# Patient Record
Sex: FEMALE | URBAN - METROPOLITAN AREA
[De-identification: names, ages, dates, MRNs, and addresses within clinical notes are randomized per-mention and may not be internally consistent; named-entity substitution may affect disease eponyms.]

---

## 2017-07-26 ENCOUNTER — THERAPY VISIT (OUTPATIENT)
Dept: PHYSICAL THERAPY | Facility: CLINIC | Age: 37
End: 2017-07-26
Payer: COMMERCIAL

## 2017-07-26 DIAGNOSIS — M25.511 RIGHT SHOULDER PAIN: Primary | ICD-10-CM

## 2017-07-26 PROCEDURE — 97161 PT EVAL LOW COMPLEX 20 MIN: CPT | Mod: GP | Performed by: PHYSICAL THERAPIST

## 2017-07-26 PROCEDURE — 97110 THERAPEUTIC EXERCISES: CPT | Mod: GP | Performed by: PHYSICAL THERAPIST

## 2017-07-26 NOTE — PROGRESS NOTES
Subjective:    Patient is a 36 year old female presenting with rehab right shoulder hpi.   Sydnee Vieira is a 36 year old female with a right shoulder condition.  Condition occurred with:  Unknown cause.  Condition occurred: during recreation/sport.  This is a new condition  Pt reports she has been going to core power yoga and has been having some right shoulder pain for the past 2 months. She saw her MD on 7-6-17 and was referred to PT. She does have a hx of some shoulder pain in the past that she received PT for that was helpful.    Patient reports pain:  Anterior and lateral.  Radiates to:  Upper arm.    and reported as 4/10.  Associated symptoms:  Painful arc. Pain is worse during the day and worse in the P.M..  Symptoms are exacerbated by using arm overhead and lying on extremity and relieved by nothing.                                                        Objective:    Standing Alignment:      Shoulder/UE:  Rounded shoulders, scapular winging L and scapular winging R                                       Shoulder Evaluation:  ROM:  AROM:    Flexion:  Left:  180    Right:  180    Abduction:  Left: 180   Right:  180                      PROM:            Internal Rotation:  Left:  50    Right:  40  External Rotation:  Left:  95    Right:  100                Pain: Pain at mid range TOMMIE elevation on right    Strength:    Flexion: Right: /5 strong/pain free    Pain:   Extension:  Right: /5  Strong/pain free  Pain:  Abduction:  Right:/5  Strong/painful    Pain:    Internal Rotation:  Right: 4-/5      Pain:+            Stability Testing:  normal      Special Tests:      Right shoulder positive for the following special tests:Impingement  Right shoulder negative for the following special tests:Labral  Palpation:  not assessed      Mobility Tests:            Scapulothoracic left:  Hypermobile  Scapulothoracic right:  Hypermobile    Scapulohumeral rhythm right:  Hypermobile  Scapulohumeral rhythm right:  Hypermobile                                    General     ROS    Assessment/Plan:      Patient is a 36 year old female with right side shoulder complaints.    Patient has the following significant findings with corresponding treatment plan.                Diagnosis 1:  R shoulder pain  Pain -  hot/cold therapy and manual therapy  Decreased strength - therapeutic exercise and therapeutic activities  Impaired muscle performance - neuro re-education  Decreased function - therapeutic activities  Instability -  Therapeutic Activity  Therapeutic Exercise    Therapy Evaluation Codes:   1) History comprised of:   Personal factors that impact the plan of care:      None.    Comorbidity factors that impact the plan of care are:      None.     Medications impacting care: None.  2) Examination of Body Systems comprised of:   Body structures and functions that impact the plan of care:      Shoulder.   Activity limitations that impact the plan of care are:      Lifting.  3) Clinical presentation characteristics are:   Stable/Uncomplicated.  4) Decision-Making    Low complexity using standardized patient assessment instrument and/or measureable assessment of functional outcome.  Cumulative Therapy Evaluation is: Low complexity.    Previous and current functional limitations:  (See Goal Flow Sheet for this information)    Short term and Long term goals: (See Goal Flow Sheet for this information)     Communication ability:  Patient appears to be able to clearly communicate and understand verbal and written communication and follow directions correctly.  Treatment Explanation - The following has been discussed with the patient:   RX ordered/plan of care  Anticipated outcomes  Possible risks and side effects  This patient would benefit from PT intervention to resume normal activities.   Rehab potential is excellent.    Frequency:  1 X week, once daily  Duration:  for 6 weeks  Discharge Plan:  Achieve all LTG.  Independent in home treatment  program.  Reach maximal therapeutic benefit.    Please refer to the daily flowsheet for treatment today, total treatment time and time spent performing 1:1 timed codes.

## 2017-07-26 NOTE — LETTER
[facility]  [address]  [place] [address] [phone]    2017    Re: Sydnee Vieira   :   1980  MRN:  [id number]   REFERRING PHYSICIAN:   Hetal Levine    [facility]  Date of Initial Evaluation:  2017  Visits: 1 Rxs Used: 1  Reason for Referral:  Right shoulder pain    EVALUATION SUMMARY  Subjective:  Patient is a 36 year old female presenting with rehab right shoulder hpi.   Sydnee Vieira is a 36 year old female with a right shoulder condition.  Condition occurred with:  Unknown cause.  Condition occurred: during recreation/sport.  This is a new condition  Pt reports she has been going to core power yoga and has been having some right shoulder pain for the past 2 months. She saw her MD on and was referred to PT. She does have a hx of some shoulder pain in the past that she received PT for that was helpful.    Patient reports pain:  Anterior and lateral.  Radiates to:  Upper arm.    and reported as 4/10.  Associated symptoms:  Painful arc. Pain is worse during the day and worse in the P.M.  Symptoms are exacerbated by using arm overhead and lying on extremity and relieved by nothing.                          Pertinent medical history includes:  Smoking.  Medical allergies: no.  Other surgeries include:  Orthopedic surgery (R medial meniscus repair ).  Current medications:  None as reported by the patient.  Current occupation is Self Employed small business, , education, student.  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    Objective:  Standing Alignment:    Shoulder/UE:  Rounded shoulders, scapular winging L and scapular winging R  Shoulder Evaluation:  ROM:  AROM:    Flexion:  Left:  180    Right:  180  Abduction:  Left: 180   Right:  180  PROM:    Internal Rotation:  Left:  50    Right:  40  External Rotation:  Left:  95    Right:  100  Pain: Pain at  mid range TOMMIE elevation on right  Strength:    Flexion: Right: /5 strong/pain free    Pain:   Extension:  Right: /5  Strong/pain free  Pain:  Abduction:  Right:/5  Strong/painful    Pain:  Internal Rotation:  Right: 4-/5      Pain:+  Stability Testing:  normal  Special Tests:    Right shoulder positive for the following special tests:Impingement  Right shoulder negative for the following special tests:Labral  Palpation:  not assessed  Mobility Tests:    Scapulothoracic left:  Hypermobile  Scapulothoracic right:  Hypermobile  Scapulohumeral rhythm right:  Hypermobile  Scapulohumeral rhythm right:  Hypermobile   Re: Sydnee Vieira   :   1980    Assessment/Plan:    Patient is a 36 year old female with right side shoulder complaints.    Patient has the following significant findings with corresponding treatment plan.                Diagnosis 1:  R shoulder pain  Pain -  hot/cold therapy and manual therapy  Decreased strength - therapeutic exercise and therapeutic activities  Impaired muscle performance - neuro re-education  Decreased function - therapeutic activities  Instability -  Therapeutic Activity  Therapeutic Exercise    Therapy Evaluation Codes:   1) History comprised of:   Personal factors that impact the plan of care:      None.    Comorbidity factors that impact the plan of care are:      None.     Medications impacting care: None.  2) Examination of Body Systems comprised of:   Body structures and functions that impact the plan of care:      Shoulder.   Activity limitations that impact the plan of care are:      Lifting.  3) Clinical presentation characteristics are:   Stable/Uncomplicated.  4) Decision-Making    Low complexity using standardized patient assessment instrument and/or measureable   assessment of functional outcome.  Cumulative Therapy Evaluation is: Low complexity.  Previous and current functional limitations:  (See Goal Flow Sheet for this information)    Short term and Long term  goals: (See Goal Flow Sheet for this information)   Communication ability:  Patient appears to be able to clearly communicate and understand verbal and written communication and follow directions correctly.  Treatment Explanation - The following has been discussed with the patient:   RX ordered/plan of care  Anticipated outcomes  Possible risks and side effects  This patient would benefit from PT intervention to resume normal activities.   Rehab potential is excellent.    Frequency:  1 X week, once daily  Duration:  for 6 weeks  Discharge Plan:  Achieve all LTG.  Independent in home treatment program.  Reach maximal therapeutic benefit.    Thank you for your referral.    INQUIRIES  Therapist: Natividad Payton  PT [facility]  [facility] PHYSICAL THERAPY  [address]  [place] [address]  Phone: [phone]  Fax: [phone]

## 2017-07-26 NOTE — PROGRESS NOTES
Subjective:    Patient is a 36 year old female presenting with rehab right shoulder hpi.                                      Pertinent medical history includes:  Smoking.  Medical allergies: no.  Other surgeries include:  Orthopedic surgery (R medial meniscus repair 2009).  Current medications:  None as reported by the patient.  Current occupation is Self Employed small business, , education, student.    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                                Objective:    System    Physical Exam    General     ROS    Assessment/Plan:

## 2017-07-26 NOTE — MR AVS SNAPSHOT
"              After Visit Summary   7/26/2017    Sydnee Vieira    MRN: [id number]           Patient Information     Date Of Birth          1980        Visit Information        Provider Department      7/26/2017 9:30 AM Natividad Payton, PT [facility] Physical Therapy        Today's Diagnoses     Right shoulder pain    -  1       Follow-ups after your visit        Your next 10 appointments already scheduled     Aug 08, 2017  1:50 PM CDT   JOHN Extremity with Natividad Payton PT   [facility] Physical Therapy ([place]  )    [address]  [place] [address] [phone]              Who to contact     If you have questions or need follow up information about today's clinic visit or your schedule please contact [facility] PHYSICAL THERAPY directly at [phone].  Normal or non-critical lab and imaging results will be communicated to you by MyChart, letter or phone within 4 business days after the clinic has received the results. If you do not hear from us within 7 days, please contact the clinic through Embedlyhart or phone. If you have a critical or abnormal lab result, we will notify you by phone as soon as possible.  Submit refill requests through Tier 3 or call your pharmacy and they will forward the refill request to us. Please allow 3 business days for your refill to be completed.          Additional Information About Your Visit        Embedlyhart Information     Tier 3 lets you send messages to your doctor, view your test results, renew your prescriptions, schedule appointments and more. To sign up, go to [email or web address] . Click on \"Log in\" on the left side of the screen, which will take you to the Welcome page. Then click on \"Sign up Now\" on the right side of the page.     You will be asked to enter the access code listed below, as well as some personal information. Please follow the " directions to create your username and password.     Your access code is: [email or web address]  Expires: 10/24/2017 10:17 AM     Your access code will  in 90 days. If you need help or a new code, please call your [place] clinic or [phone].        Care EveryWhere ID     This is your Care EveryWhere ID. This could be used by other organizations to access your [place] medical records  [id number]         Blood Pressure from Last 3 Encounters:   No data found for BP    Weight from Last 3 Encounters:   No data found for Wt              We Performed the Following     HC PT EVAL, LOW COMPLEXITY     JOHN INITIAL EVAL REPORT     THERAPEUTIC EXERCISES        Primary Care Provider    None Specified       No primary provider on file.        Equal Access to Services     [facility] : Ricardo Kumar, luma pearl, sue loco, verito acosta . So [facility] [phone].    ATENCIÓN: Si habla español, tiene a padilla disposición servicios gratuitos de asistencia lingüística. Llame al [phone].    We comply with applicable federal civil rights laws and [place] laws. We do not discriminate on the basis of race, color, national origin, age, disability sex, sexual orientation or gender identity.            Thank you!     Thank you for choosing [facility] PHYSICAL THERAPY  for your care. Our goal is always to provide you with excellent care. Hearing back from our patients is one way we can continue to improve our services. Please take a few minutes to complete the written survey that you may receive in the mail after your visit with us. Thank you!             Your Updated Medication List - Protect others around you: Learn how to safely use, store and throw away your medicines at www.disposemymeds.org.      Notice  As of 2017 10:17 AM    You have not been prescribed any medications.

## 2017-08-08 ENCOUNTER — THERAPY VISIT (OUTPATIENT)
Dept: PHYSICAL THERAPY | Facility: CLINIC | Age: 37
End: 2017-08-08
Payer: COMMERCIAL

## 2017-08-08 DIAGNOSIS — G89.29 CHRONIC RIGHT SHOULDER PAIN: ICD-10-CM

## 2017-08-08 DIAGNOSIS — M25.511 CHRONIC RIGHT SHOULDER PAIN: ICD-10-CM

## 2017-08-08 PROCEDURE — 97110 THERAPEUTIC EXERCISES: CPT | Mod: GP | Performed by: PHYSICAL THERAPIST

## 2017-08-08 PROCEDURE — 97112 NEUROMUSCULAR REEDUCATION: CPT | Mod: GP | Performed by: PHYSICAL THERAPIST

## 2017-08-08 NOTE — LETTER
[facility] PHYSICAL THERAPY  [address]  [place] [address] [phone]    2017    Re: Sydnee Vieira   :   1980  MRN:  [id number]   REFERRING PHYSICIAN:   Zachariah eLvine    [facility]    Date of Initial Evaluation:  2017  Visits:  Rxs Used: 2  Reason for Referral:  Chronic right shoulder pain    DISCHARGE REPORT    Progress reporting period is from 17 to 17.       SUBJECTIVE   Subjective: Feeling better. Has been doing the exs and stopped yoga    Current Pain level: 4/10.     Previous pain level was  4/10  .   Changes in function:  Yes (See Goal flowsheet attached for changes in current functional level)  Adverse reaction to treatment or activity: None    OBJECTIVE  Changes noted in objective findings:  Patient has failed to return to therapy so current objective findings are unknown. and The objective findings below are from DOS 17.  Objective: Feels less pain having elbow pain showed her the lateral epicondyle exs for stretches. AROM pain at end of flexion and abduction. Winging notedL>R. Advanced the exs further for scapular control. She [place] and then to [place] until the end of Sept. Plans to RTC then. Pt did not return for further PT    ASSESSMENT/PLAN  Updated problem list and treatment plan: Diagnosis 1:  R shoulder pain    STG/LTGs have been met or progress has been made towards goals:  Yes (See Goal flow sheet completed today.)  Assessment of Progress: The patient has not returned to therapy. Current status is unknown.  Self Management Plans:  Patient has been instructed in a home treatment program.    Sydnee continues to require the following intervention to meet STG and LTG's:  NA    Recommendations:  DC PT            Thank you for your referral.    INQUIRIES  Therapist: Natividad Monteiro, PT   [facility]  PHYSICAL THERAPY  [address]  [place] [address]  Phone: [phone]  Fax: [phone]

## 2017-08-08 NOTE — MR AVS SNAPSHOT
"              After Visit Summary   2017    Sydnee Vieira    MRN: [id number]           Patient Information     Date Of Birth          1980        Visit Information        Provider Department      2017 1:50 PM Natividad Payton, PT [facility] Physical Therapy        Today's Diagnoses     Chronic right shoulder pain           Follow-ups after your visit        Who to contact     If you have questions or need follow up information about today's clinic visit or your schedule please contact [facility] PHYSICAL THERAPY directly at [phone].  Normal or non-critical lab and imaging results will be communicated to you by Adyenhart, letter or phone within 4 business days after the clinic has received the results. If you do not hear from us within 7 days, please contact the clinic through Grockitt or phone. If you have a critical or abnormal lab result, we will notify you by phone as soon as possible.  Submit refill requests through DrawQuest or call your pharmacy and they will forward the refill request to us. Please allow 3 business days for your refill to be completed.          Additional Information About Your Visit        MyChart Information     DrawQuest lets you send messages to your doctor, view your test results, renew your prescriptions, schedule appointments and more. To sign up, go to [email or web address] . Click on \"Log in\" on the left side of the screen, which will take you to the Welcome page. Then click on \"Sign up Now\" on the right side of the page.     You will be asked to enter the access code listed below, as well as some personal information. Please follow the directions to create your username and password.     Your access code is: [email or web address]  Expires: 10/24/2017 10:17 AM     Your access code will  in 90 days. If you need help or a new code, please call your [place] clinic or [phone].        Care EveryWhere ID     This " is your Care EveryWhere ID. This could be used by other organizations to access your [place] medical records  [id number]         Blood Pressure from Last 3 Encounters:   No data found for BP    Weight from Last 3 Encounters:   No data found for Wt              We Performed the Following     NEUROMUSCULAR RE-EDUCATION     THERAPEUTIC EXERCISES        Primary Care Provider    None Specified       No primary provider on file.        Equal Access to Services     [facility]: Hadii aad ku hadjoseo Sovalenteali, waaxda luqadaha, qaybta kaalmada javiertitusshila, verito pineda rolaphilippe woodwardcherrieshayne acosta . So [facility] [phone].    ATENCIÓN: Si habla español, tiene a padilla disposición servicios gratuitos de asistencia lingüística. Llame al [phone].    We comply with applicable federal civil rights laws and [place] laws. We do not discriminate on the basis of race, color, national origin, age, disability sex, sexual orientation or gender identity.            Thank you!     Thank you for choosing [facility] PHYSICAL THERAPY  for your care. Our goal is always to provide you with excellent care. Hearing back from our patients is one way we can continue to improve our services. Please take a few minutes to complete the written survey that you may receive in the mail after your visit with us. Thank you!             Your Updated Medication List - Protect others around you: Learn how to safely use, store and throw away your medicines at www.disposemymeds.org.      Notice  As of 8/8/2017  2:35 PM    You have not been prescribed any medications.

## 2017-11-21 PROBLEM — M25.511 RIGHT SHOULDER PAIN: Status: RESOLVED | Noted: 2017-07-26 | Resolved: 2017-11-21

## 2020-08-24 ENCOUNTER — RECORDS - HEALTHEAST (OUTPATIENT)
Dept: LAB | Facility: CLINIC | Age: 40
End: 2020-08-24

## 2020-08-25 LAB
ABO/RH(D): NORMAL
ABORH REPEAT: NORMAL
ANTIBODY SCREEN: NEGATIVE

## 2021-01-24 ENCOUNTER — RECORDS - HEALTHEAST (OUTPATIENT)
Dept: LAB | Facility: CLINIC | Age: 41
End: 2021-01-24

## 2021-01-24 LAB
HBV SURFACE AG SERPL QL IA: NEGATIVE
TSH SERPL DL<=0.005 MIU/L-ACNC: 1.34 UIU/ML (ref 0.3–5)

## 2021-01-25 LAB
25(OH)D3 SERPL-MCNC: 12.7 NG/ML (ref 30–80)
ABO/RH(D): NORMAL
ABORH REPEAT: NORMAL
ANTIBODY SCREEN: NEGATIVE
BASOPHILS # BLD AUTO: 0.1 THOU/UL (ref 0–0.2)
BASOPHILS NFR BLD AUTO: 1 % (ref 0–2)
EOSINOPHIL # BLD AUTO: 0.5 THOU/UL (ref 0–0.4)
EOSINOPHIL NFR BLD AUTO: 6 % (ref 0–6)
ERYTHROCYTE [DISTWIDTH] IN BLOOD BY AUTOMATED COUNT: 13 % (ref 11–14.5)
HCT VFR BLD AUTO: 42 % (ref 35–47)
HGB BLD-MCNC: 13.5 G/DL (ref 12–16)
IMM GRANULOCYTES # BLD: 0 THOU/UL
IMM GRANULOCYTES NFR BLD: 0 %
LYMPHOCYTES # BLD AUTO: 2.6 THOU/UL (ref 0.8–4.4)
LYMPHOCYTES NFR BLD AUTO: 26 % (ref 20–40)
MCH RBC QN AUTO: 28 PG (ref 27–34)
MCHC RBC AUTO-ENTMCNC: 32.1 G/DL (ref 32–36)
MCV RBC AUTO: 87 FL (ref 80–100)
MONOCYTES # BLD AUTO: 0.7 THOU/UL (ref 0–0.9)
MONOCYTES NFR BLD AUTO: 7 % (ref 2–10)
NEUTROPHILS # BLD AUTO: 5.9 THOU/UL (ref 2–7.7)
NEUTROPHILS NFR BLD AUTO: 61 % (ref 50–70)
PLATELET # BLD AUTO: 278 THOU/UL (ref 140–440)
PMV BLD AUTO: 10 FL (ref 8.5–12.5)
RBC # BLD AUTO: 4.83 MILL/UL (ref 3.8–5.4)
RUBV IGG SERPL QL IA: POSITIVE
T PALLIDUM AB SER QL: NEGATIVE
WBC: 9.8 THOU/UL (ref 4–11)